# Patient Record
(demographics unavailable — no encounter records)

---

## 2025-07-08 NOTE — PHYSICAL EXAM
[Normal Appearance] : normal appearance [Well Groomed] : well groomed [General Appearance - In No Acute Distress] : no acute distress [Respiration, Rhythm And Depth] : normal respiratory rhythm and effort [Exaggerated Use Of Accessory Muscles For Inspiration] : no accessory muscle use [Abdomen Soft] : soft [Abdomen Tenderness] : non-tender [Costovertebral Angle Tenderness] : no ~M costovertebral angle tenderness [Urethral Meatus] : normal urethra [Urinary Bladder Findings] : the bladder was normal on palpation [External Female Genitalia] : normal external genitalia [Normal Station and Gait] : the gait and station were normal for the patient's age [] : no rash [No Focal Deficits] : no focal deficits [Oriented To Time, Place, And Person] : oriented to person, place, and time [Affect] : the affect was normal [Mood] : the mood was normal [RN] : RN [de-identified] : BLLE Edema + 2 [de-identified] : mild rectocele stage 1,neg CST  [FreeTextEntry2] : Renetta Linares

## 2025-07-08 NOTE — REVIEW OF SYSTEMS
[Negative] : Heme/Lymph [Lower Ext Edema] : lower extremity edema [Abdominal Pain] : abdominal pain [see HPI] : see HPI [Constipation] : no constipation [Dysuria] : no dysuria [Incontinence] : no incontinence [Vaginal Discharge] : no vaginal discharge

## 2025-07-08 NOTE — ASSESSMENT
[Urinary Symptom or Sign (788.99\R39.89)] : implantation [FreeTextEntry1] : 56 year old woman  ,1 miscarriage, woman presents with the chief complaint of an abnormal finding on her renal /abdominal ultrasound suspicious Right Renal stone 0.4  cm, no CD with pt. Abdominal US was Performed on 2025 for chief complaint of abdominal pain for the past two months mainly on the right lower quad as well as supra pubic region, patient denies any prior urological issues and or concerns. She had a DIF - Her son 30 years old passed away in a motor vehicle accident three weeks ago and buried last week, patient very emotional and Emotional Support rendered. She has a psychologist appointment this evening at six pm.  UA microscopic analysis and culture obtained  PVR > 639 ML.  Pt not in  a frame of mind to learn CIC today  Consented to Wick Cath placement  Wick catheter size 14 was placed by ROMARIO VALDEZ without any issue but it only drained 50 cc of yellow urine.  PVR repeated showed greater than 1125 ML probable ascites. Patient has bilateral LLE swelling pitting edema +2. We did a BMP today  Pt requested for wick cath to be removed as she is very uncomfortable and is leaving for a [employer] convention this Friday , deflated ballon and wick ctah removed, tolerated  Patient will get a CT of the abdomen and pelvis with and without contrast to assess concerns for ascites. Patient will return to the office after the CAT scan is performed to address next steps and plan for metabolic work up for kidney stone Trial of Tamsulosin 0.4  mg po daily at HS and side effects reviewed Fluids and ED precautions reviewed Obtain CD images of Renal and Abdominal US from [facility]

## 2025-07-08 NOTE — HISTORY OF PRESENT ILLNESS
[FreeTextEntry1] : 56 year old woman  ,1 miscarriage, woman presents with the chief complaint of an abnormal finding on her renal /abdominal ultrasound suspicious Right Renal stone 0.4  cm, no CD with pt. Abdominal US was Performed on 2025 for chief complaint of abdominal pain for the past two months mainly on the right lower quad as well as supra pubic region, patient denies any prior urological issues and or concerns. She had a DIF - Her son 30 years old passed away in a motor vehicle accident three weeks ago and buried last week, patient very emotional and Emotional Support rendered. She has a psychologist appointment this evening at six pm. She drinks 16 ounces of water times three one coffee and 2 cups of tea.  Patient voids during the day six times and at night X four times. she denied any obstructive and or  irritative symptoms.  UA microscopic analysis and culture obtained  PVR > 639 ML.  Pt not in  a frame of mind to learn CIC today  Consented to Myers Cath placement  Myers catheter size 14 was placed by ROMARIO VALDEZ without any issue but it only drained 50 cc of yellow urine.  PVR repeated showed greater than 1125 ML probable ascites. Patient has bilateral LLE swelling pitting edema +2. We did a BMP today  Patient will get a CT of the abdomen and pelvis within and without contrast to assess concerns for ascites. Patient will return to the office after the CAT scan is performed to address next steps  Fluids and ED precautions reviewed Obtain CD images of Renal and Abdominal US from [facility]

## 2025-07-14 NOTE — HISTORY OF PRESENT ILLNESS
[de-identified] :  This visit was provided via telehealth using real-time 2-way audio visual technology. The patient, GINGER IRBY, was located at home [address] [place] [address] at the time of the visit. This provider was located at the clinic in [place], [place] at the time of the visit. The provider, patient and ~  ~  participated in the telehealth encounter.  Verbal consent was given 07/15/2025 by the patient.  Patient is a 55yo F referred to surgical oncology for 18.4 x 17.0 cm anterior abdominal/pelvic mass seen on a CT from 7/9/2025.  Patient presented to PCP with c/o R abdominal "bulge" and pain.  Abdominal U/S 6/27/2025 Questionable 0.4cm echogenic non shadowing focus. Right renal calculus.   Referred to  for renal calculus. Patient voids during the day six times and at night four times. She denied any obstructive and or irritative symptoms. On bladder US, found to have post void residual > 639mL. Myers catheter placed, only 50cc drained. Post catheter placement PVR > 1125mL, probable ascites. Additionally on  exam, patient with BLE 2+ pitting edema.   CT AP ordered, completed 7/8/2025: REPRODUCTIVE ORGANS: The uterus is mildly enlarged with hyper and hypoattenuating masses, these likely represent fibroids. In the anterior abdomen and pelvis there is aheterogeneous 18.4 x 17 cm mass. PERITONEUM/RETROPERITONEUM: There is a trace amount of perihepatic fluid. There is a small amount of abdominal and pelvic free fluid. There is minimal thickening of the anterior omentum which can be seen in a early metastatic disease. LYMPH NODES: There are prominent lymph nodes identified in the right mesentery (series 6 image 47, and right external iliac chain (series 6 image 91) ABDOMINAL WALL: Bilobed cystic lesion in the upper abdominal anterior abdominal wall measures 4.0 cm.  Patient referred to surgical oncology. CT chest negative for thoracic mets but notes a 4 mm RLL nodule.  7/8 BMP WNL 7/10 CEA, Ca19-9, AFP negative but Ca 125 noted to be 226. CBC WNL.  PMHx: Lumar Radiculopathy, Thyroid Cancer, Sacroiliac Joint Inflammation, Urinary Retention, Nephrolithiasis PSHx: Facial Surgery, Right Thyroid Lobectomy, Shoulder Surgery, Umbilical Herna Repair FHx: Mother Stomach Cancer Social: Never smoker, no ETOH. Son recently passed away in MVA (2025) Allergies: NKDA Meds: Tamsulosin, Vit C, Vit D EGD: C-Scope:  PCP Dr King Gibbs [phone]

## 2025-07-14 NOTE — ASSESSMENT
[FreeTextEntry1] : 57yo F with 18.4 x 17 cm abdominal mass with possible mets to peritoneum  CT AP 7/8/2025: 18 cm heterogeneous mass in the anterior abdomen extending inferiorly towards the pelvis, which is concerning for neoplasm.  There is mild stranding of the omentum in the anterior abdominal wall, this is nonspecific but given the findings is concerning for possible early omental caking.  CT Chest 7/10/2025 4 mm RLL nodule but no obvious thoracic mets  7/10/25 AFP, CEA, Ca19-9 negative but Ca 125 elevated to 226.  Plan: Refer for bx, f/o path results  refer med onc pending final path  cancer genetics liq bx?

## 2025-07-21 NOTE — OB HISTORY
[Total Preg ___] : : [unfilled] [Full Term ___] : [unfilled] (full-term) [Abortions ___] : [unfilled] (abortions) [Living ___] : [unfilled] (living)

## 2025-07-22 NOTE — PHYSICAL EXAM
[MA] : MA [FreeTextEntry2] : Sarah [Abnormal] : Bimanual Exam: Abnormal [Normal] : Recto-Vaginal Exam: Normal [de-identified] : large central pelvic mass, minimally tender to palpation [de-identified] : large mass rising out of pelvis [Fully active, able to carry on all pre-disease performance without restriction] : Status 0 - Fully active, able to carry on all pre-disease performance without restriction

## 2025-07-22 NOTE — DISCUSSION/SUMMARY
[Reviewed Clinical Lab Test(s)] : Results of clinical tests were reviewed. [Reviewed Radiology Report(s)] : Radiology reports were reviewed. [Reviewed Radiology Film/Image(s)] : Images from radiology studies were reviewed and examined. [Discuss Alternatives/Risks/Benefits w/Patient] : All alternatives, risks, and benefits were discussed with the patient/family and all questions were answered.  Patient expressed good understanding and appreciates the importance of follow up as recommended. [FreeTextEntry1] : I sat down with Susan and reviewed the differential diagnosis of a pelvic mass in detail including benign, borderline and malignant ovarian neoplasms. I would recommend proceeding with a laparotomy, hysterectomy and bilateral salpingoopherectomy for definitive diagnosis. The adnexa will be submitted for frozen section. Surgical staging will be performed if an ovarian malignancy is diagnosed. I described the staging of ovarian cancer and the importance of surgical staging results for determining prognosis and treatment. She understands that comprehensive staging will include retroperitoneal lymph node dissection, omentectomy and peritoneal biopsies. We discussed the surgical management of advanced ovarian cancer. The risks and benefits of surgery were discussed in detail, including but not limited to risks of bleeding, infection, poor wound healing, venous thromboembolism and intraoperative organ injury. We discussed expectations for postoperative recuperation after laparotomy. Alternatives to surgery, namely expectant management, were reviewed.   Susan expressed good understanding of the information presented and wishes to proceed. She was noted to have bilateral leg swelling and left calf pain on today's exam. She will need urgent LE Doppler evaluation for DVT and an appointment was made for her at [facility] later today. Provided this study is negative, will plan to book surgery for 7/28. If evidence of VTE will need to start anticoagulation with LMWH with a delay prior to surgery.

## 2025-07-22 NOTE — ASSESSMENT
[FreeTextEntry1] : Large complex cystic and solid pelvic mass suspicious for adnexal malignancy.  Bilateral leg swelling, evaluate for DVT.

## 2025-07-22 NOTE — HISTORY OF PRESENT ILLNESS
[FreeTextEntry1] : Referred by Uro Dr. Dang Rodas PCP: Dr Rey Gibbs   Ms. IRBY is a 56 year old  female LMP age 55, seen for further evaluation and management of a pelvic mass. She reports a few weeks ago she began experiencing abdominal swelling and upper/lower abdominal pain. She was evaluated by Urology who recommended CT A/P which demonstrated an 18.4 cm pelvic mass. Also notes LLE swelling that started a few weeks ago as well which she reports has become more significant. She denies vaginal bleeding or discharge. She is here today to discuss further management.  25 CA-125 = 226 GI Ca 19-9 = 17 CEA = 1.5  7/10/25 CT A/P REPRODUCTIVE ORGANS: The uterus is mildly enlarged with hyper and hypoattenuating masses, these likely represent fibroids. In the anterior abdomen and pelvis there is a heterogeneous 18.4 x 17 cm mass. LYMPH NODES: There are prominent lymph nodes identified in the right mesentery (series 6 image 47, and right external iliac chain (series 6 image 91) ABDOMINAL WALL: Bilobed cystic lesion in the upper abdominal anterior abdominal wall measures 4.0 cm. IMPRESSION: There is an 18 cm heterogeneous mass in the anterior abdomen extending inferiorly towards the pelvis, which is concerning for neoplasm. Further evaluation with biopsy is advised to better assess. There is mild stranding of the omentum in the anterior abdominal wall, this is nonspecific but given the findings is concerning for possible early omental caking.  PMHx- No pertinent medical hx PSHx- facial surgery, shoulder surgery, partial thyroidectomy, umbilical hernia repair (?mesh) Family hx of cancer- Mother with gastric cancer, diagnosed at age 80.    HM Pap- , negative per pt report. Reports no hx of abnormal paps. States that she has not had GYN care in a few years. Mammo- , negative per pt report Colonoscopy- , normal per pt report. Due to repeat screening in 5 years.

## 2025-07-22 NOTE — DISCUSSION/SUMMARY
[Reviewed Clinical Lab Test(s)] : Results of clinical tests were reviewed. [Reviewed Radiology Report(s)] : Radiology reports were reviewed. [Reviewed Radiology Film/Image(s)] : Images from radiology studies were reviewed and examined. [Discuss Alternatives/Risks/Benefits w/Patient] : All alternatives, risks, and benefits were discussed with the patient/family and all questions were answered.  Patient expressed good understanding and appreciates the importance of follow up as recommended. [FreeTextEntry1] : I sat down with Susan and reviewed the differential diagnosis of a pelvic mass in detail including benign, borderline and malignant ovarian neoplasms. I would recommend proceeding with a laparotomy, hysterectomy and bilateral salpingoopherectomy for definitive diagnosis. The adnexa will be submitted for frozen section. Surgical staging will be performed if an ovarian malignancy is diagnosed. I described the staging of ovarian cancer and the importance of surgical staging results for determining prognosis and treatment. She understands that comprehensive staging will include retroperitoneal lymph node dissection, omentectomy and peritoneal biopsies. We discussed the surgical management of advanced ovarian cancer. The risks and benefits of surgery were discussed in detail, including but not limited to risks of bleeding, infection, poor wound healing, venous thromboembolism and intraoperative organ injury. We discussed expectations for postoperative recuperation after laparotomy. Alternatives to surgery, namely expectant management, were reviewed.   Susan expressed good understanding of the information presented and wishes to proceed. She was noted to have bilateral leg swelling and left calf pain on today's exam. She will need urgent LE Doppler evaluation for DVT and an appointment was made for her at [facility] later today. Provided this study is negative, will plan to book surgery for 7/28. If evidence of VTE will need to start anticoagulation with LMWH with a delay prior to surgery.

## 2025-07-22 NOTE — PHYSICAL EXAM
[MA] : MA [FreeTextEntry2] : Sarah [Abnormal] : Bimanual Exam: Abnormal [Normal] : Recto-Vaginal Exam: Normal [de-identified] : large central pelvic mass, minimally tender to palpation [de-identified] : large mass rising out of pelvis [Fully active, able to carry on all pre-disease performance without restriction] : Status 0 - Fully active, able to carry on all pre-disease performance without restriction

## 2025-07-22 NOTE — REVIEW OF SYSTEMS
[Negative] : Genitourinary [FreeTextEntry3] : Pt shared that her eldest son passed away a few weeks ago from a tragic accident. [de-identified] : abdominal swelling, abdominal pain

## 2025-07-22 NOTE — REVIEW OF SYSTEMS
[Negative] : Genitourinary [FreeTextEntry3] : Pt shared that her eldest son passed away a few weeks ago from a tragic accident. [de-identified] : abdominal swelling, abdominal pain

## 2025-07-22 NOTE — PAST MEDICAL HISTORY
[Postmenopausal] : The patient is postmenopausal [Menarche Age ____] : age at menarche was [unfilled] [Menopause Age____] : age at menopause was [unfilled] [Definite ___ (Date)] : the last menstrual period was [unfilled] [Total Preg ___] : G[unfilled] [Live Births ___] : P[unfilled]  [Abortions ___] : Abortions:[unfilled] [Living ___] : Living: [unfilled] [AB Spont ___] : miscarriages: [unfilled]

## 2025-07-22 NOTE — PAST MEDICAL HISTORY
EKG, BP, O2 monitors applied as per protocol.  [Postmenopausal] : The patient is postmenopausal [Menarche Age ____] : age at menarche was [unfilled] [Menopause Age____] : age at menopause was [unfilled] [Definite ___ (Date)] : the last menstrual period was [unfilled] [Total Preg ___] : G[unfilled] [Live Births ___] : P[unfilled]  [Abortions ___] : Abortions:[unfilled] [Living ___] : Living: [unfilled] [AB Spont ___] : miscarriages: [unfilled]